# Patient Record
Sex: MALE | Race: BLACK OR AFRICAN AMERICAN
[De-identification: names, ages, dates, MRNs, and addresses within clinical notes are randomized per-mention and may not be internally consistent; named-entity substitution may affect disease eponyms.]

---

## 2020-08-06 ENCOUNTER — HOSPITAL ENCOUNTER (EMERGENCY)
Dept: HOSPITAL 12 - ER | Age: 30
Discharge: LEFT BEFORE DISCHARGE | End: 2020-08-06
Payer: MEDICAID

## 2020-08-06 VITALS — BODY MASS INDEX: 29.22 KG/M2 | HEIGHT: 76 IN | WEIGHT: 240 LBS

## 2020-08-06 DIAGNOSIS — F20.0: Primary | ICD-10-CM

## 2020-08-06 DIAGNOSIS — Z59.0: ICD-10-CM

## 2020-08-06 DIAGNOSIS — F43.10: ICD-10-CM

## 2020-08-06 PROCEDURE — A4663 DIALYSIS BLOOD PRESSURE CUFF: HCPCS

## 2020-08-06 SDOH — ECONOMIC STABILITY - HOUSING INSECURITY: HOMELESSNESS: Z59.0

## 2020-08-06 NOTE — NUR
Consultation:



9:15am:  SW arrived to the ED and met with Dr. Gregory to consult on patient's case and need 
for .  SW then met with the patient, who was in his assigned ED bed.  Patient 
is a 30 year old -American male, homeless.  Patient was alert, oriented, and 
expressed agreement for  to enter the room.  SW introduced herself, and 
attempted to generate a conversation with the patient regarding his request to meet with a 
.  Patient responded saying "I've already told other people what I need, I 
don't have time to explain my needs to you again".  SW acknowledged the patient's statement, 
and tried to explain to the patient that  was contacted to try and provide 
him with resources and information based on the needs he had expressed to other staff.  
Patient then began to question this SW by making comments such as "well what do you know?" 
and "what did they tell you".  SW explained her role to the patient, and patient then became 
demeaning and insulting towards the .  Patient's thought process was somewhat 
circumstantial, and he presented argumentative, unwilling to respond to this 's 
questions, but instead questioning the 's reason for meeting with him.  SW once 
again tried to explain her role to the patient, and how  can provide support 
and assistance to the patient, however the patient was unwilling to appropriately engage in 
dialogue with this SW and continued to be uncooperative and argumentative.  Patient then 
expressed refusing any assistance or resources.  SW ended this interview, and reported all 
above to ROSA Haney and Dr. Gregory.  No further SS interventions needed at this time.

## 2020-08-06 NOTE — NUR
CARMELO TALKED TO THE PT.PT REFUSED RESOURSES PAKAGE FOR HOMELESS 
PEOPLE. DR GOLDMAN TALKED TO THE PT TO ADDRESS HIS NEEDS.

## 2020-08-06 NOTE — NUR
PT IS IN ROOM #2B. DR GOLDMAN EVALUATED THE PT.  CARMELO WAS CALLED 
TO EVALUATE THE PT. MESSAGE WAS LEFT ON HER PHONE.

## 2021-08-07 ENCOUNTER — HOSPITAL ENCOUNTER (EMERGENCY)
Facility: MEDICAL CENTER | Age: 31
End: 2021-08-07
Attending: EMERGENCY MEDICINE

## 2021-08-07 VITALS
SYSTOLIC BLOOD PRESSURE: 117 MMHG | TEMPERATURE: 97.9 F | DIASTOLIC BLOOD PRESSURE: 74 MMHG | RESPIRATION RATE: 18 BRPM | HEART RATE: 71 BPM | OXYGEN SATURATION: 94 %

## 2021-08-07 DIAGNOSIS — Z59.00 HOMELESSNESS: ICD-10-CM

## 2021-08-07 PROCEDURE — 99283 EMERGENCY DEPT VISIT LOW MDM: CPT

## 2021-08-07 SDOH — ECONOMIC STABILITY - HOUSING INSECURITY: HOMELESSNESS UNSPECIFIED: Z59.00

## 2021-08-08 NOTE — ED NOTES
Attempted to discharge patient.  He is requesting the cab voucher to take him to [place], but doesn't not have a specific address for his destination.
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
## 2021-08-08 NOTE — ED NOTES
Pt extremely aggressive cursing in hallway of ER refusing to leave, kneeling on the ground. Security called.   Cab voucher given. PAR called cab services.
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
## 2021-08-09 ENCOUNTER — HOSPITAL ENCOUNTER (EMERGENCY)
Facility: MEDICAL CENTER | Age: 31
End: 2021-08-09
Attending: EMERGENCY MEDICINE

## 2021-08-09 VITALS
RESPIRATION RATE: 16 BRPM | BODY MASS INDEX: 28.97 KG/M2 | DIASTOLIC BLOOD PRESSURE: 68 MMHG | HEIGHT: 76 IN | HEART RATE: 82 BPM | TEMPERATURE: 97.3 F | WEIGHT: 237.88 LBS | SYSTOLIC BLOOD PRESSURE: 129 MMHG | OXYGEN SATURATION: 94 %

## 2021-08-09 DIAGNOSIS — F41.9 ANXIETY: ICD-10-CM

## 2021-08-09 PROCEDURE — 99283 EMERGENCY DEPT VISIT LOW MDM: CPT
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
## 2021-08-10 NOTE — ED NOTES
ERP at bedside. Pt agrees with plan of care discussed by ERP. AIDET acknowledged with patient. Imelda in low position, side rail up for pt safety. Call light within reach. Will continue to monitor.

## 2023-08-01 ENCOUNTER — EMERGENCY (EMERGENCY)
Facility: HOSPITAL | Age: 33
LOS: 1 days | Discharge: HOME | End: 2023-08-01
Attending: EMERGENCY MEDICINE
Payer: SELF-PAY

## 2023-08-01 VITALS
SYSTOLIC BLOOD PRESSURE: 123 MMHG | RESPIRATION RATE: 18 BRPM | OXYGEN SATURATION: 95 % | WEIGHT: 238.54 LBS | DIASTOLIC BLOOD PRESSURE: 86 MMHG | HEART RATE: 91 BPM | TEMPERATURE: 98 F

## 2023-08-01 PROCEDURE — 71046 X-RAY EXAM CHEST 2 VIEWS: CPT | Mod: 26

## 2023-08-01 PROCEDURE — 99283 EMERGENCY DEPT VISIT LOW MDM: CPT | Mod: 25

## 2023-08-01 PROCEDURE — 71046 X-RAY EXAM CHEST 2 VIEWS: CPT

## 2023-08-01 PROCEDURE — 99053 MED SERV 10PM-8AM 24 HR FAC: CPT

## 2023-08-01 PROCEDURE — 94640 AIRWAY INHALATION TREATMENT: CPT

## 2023-08-01 PROCEDURE — 99284 EMERGENCY DEPT VISIT MOD MDM: CPT

## 2023-08-01 RX ORDER — ACETAMINOPHEN 500 MG
650 TABLET ORAL ONCE
Refills: 0 | Status: COMPLETED | OUTPATIENT
Start: 2023-08-01 | End: 2023-08-01

## 2023-08-01 RX ORDER — ALBUTEROL 90 UG/1
2 AEROSOL, METERED ORAL
Qty: 1 | Refills: 1
Start: 2023-08-01

## 2023-08-01 RX ORDER — FLUTICASONE PROPIONATE 50 MCG
2 SPRAY, SUSPENSION NASAL ONCE
Refills: 0 | Status: COMPLETED | OUTPATIENT
Start: 2023-08-01 | End: 2023-08-01

## 2023-08-01 RX ORDER — ALBUTEROL 90 UG/1
2 AEROSOL, METERED ORAL EVERY 6 HOURS
Refills: 0 | Status: DISCONTINUED | OUTPATIENT
Start: 2023-08-01 | End: 2023-08-08

## 2023-08-01 RX ADMIN — Medication 20 MILLIGRAM(S): at 06:28

## 2023-08-01 RX ADMIN — Medication 650 MILLIGRAM(S): at 05:50

## 2023-08-01 RX ADMIN — Medication 2 SPRAY(S): at 05:51

## 2023-08-01 RX ADMIN — Medication 100 MILLIGRAM(S): at 05:50

## 2023-08-01 RX ADMIN — ALBUTEROL 2 PUFF(S): 90 AEROSOL, METERED ORAL at 06:28

## 2023-08-01 NOTE — ED PROVIDER NOTE - OBJECTIVE STATEMENT
33 year old male with no med hx presented to ED c/o sore throat, cough, congestion x 4 days. denies fever/chills, n/v/d, chest pain and sob

## 2023-08-01 NOTE — ED PROVIDER NOTE - ATTENDING APP SHARED VISIT CONTRIBUTION OF CARE
cough congestion, URI symptoms; +wheezing on exam, otherwise normal and looks well; improved with ED meds; agree with acp plan of care    This was a shared visit with ROSEANNA. I reviewed and verified the documentation and independently performed the documented history/exam/mdm.

## 2023-08-01 NOTE — ED PROVIDER NOTE - PHYSICAL EXAMINATION
Physical Examination:   Gen: NAD, AOx3  Head: NCAT  HEENT: EOMI, oral mucosa moist, normal conjunctiva, neck supple  Lung: no respiratory distress + mild wheezing   CV:  Normal perfusion  Abd: soft, NT ND  MSK: No edema, no visible deformities  Neuro: No focal neurologic deficits  Skin: No rash   Psych: normal affect

## 2023-08-01 NOTE — ED ADULT NURSE NOTE - OBJECTIVE STATEMENT
Assumed pt care 0540. A&Ox4. C/O sore throat and congestion for 4 days. Pt states he has also had yellow phlegm. PT refusing to answer more questions. PT states "I don't do good answering questions. I want my meds and I want to leave." Pt appears agitated. PT denies any PMH. NAD. Pt made aware of plan of care and verbalized understanding.

## 2023-08-01 NOTE — ED PROVIDER NOTE - NSFOLLOWUPINSTRUCTIONS_ED_ALL_ED_FT
Follow up with PCP within 1 week   take prednisone as directed   Return if new or worsening symptoms

## 2023-08-01 NOTE — ED PROVIDER NOTE - PATIENT PORTAL LINK FT
You can access the FollowMyHealth Patient Portal offered by [facility] by registering at the following website: [email or web address]. By joining [facility]’s FollowMyHealth portal, you will also be able to view your health information using other applications (apps) compatible with our system.

## 2024-01-02 NOTE — ED ADULT NURSE NOTE - PAIN: BODY LOCATION
[FreeTextEntry1] : We will do routine blood work in the form of CBC, CMP, A1c, lipid, TSH, B12 folate at this point. he may follow-up earlier if needed Pt was told to call with problems or concerns. The patient was told to seek immediate attention by calling 911 or going to the ER if his condition does not improve or gets acutely worse. After ordering blood work.  Patient states that he does not want to do blood work today.  Patient refused to get blood work done today. A total of 30 minutes or greater was spent on this encounter. throat